# Patient Record
Sex: MALE | Race: WHITE
[De-identification: names, ages, dates, MRNs, and addresses within clinical notes are randomized per-mention and may not be internally consistent; named-entity substitution may affect disease eponyms.]

---

## 2020-02-15 ENCOUNTER — HOSPITAL ENCOUNTER (EMERGENCY)
Dept: HOSPITAL 63 - ER | Age: 65
Discharge: HOME | End: 2020-02-15
Payer: OTHER GOVERNMENT

## 2020-02-15 VITALS — WEIGHT: 211.64 LBS | HEIGHT: 68 IN | BODY MASS INDEX: 32.08 KG/M2

## 2020-02-15 VITALS — SYSTOLIC BLOOD PRESSURE: 98 MMHG | DIASTOLIC BLOOD PRESSURE: 61 MMHG
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
## 2020-02-15 DIAGNOSIS — G89.11: Primary | ICD-10-CM

## 2020-02-15 DIAGNOSIS — M25.551: ICD-10-CM

## 2020-02-15 DIAGNOSIS — M25.552: ICD-10-CM

## 2020-02-15 PROCEDURE — 73502 X-RAY EXAM HIP UNI 2-3 VIEWS: CPT

## 2020-02-15 PROCEDURE — 99283 EMERGENCY DEPT VISIT LOW MDM: CPT

## 2020-02-15 NOTE — PHYS DOC
Adult General


Chief Complaint


Chief Complaint:  HIP PAIN





HPI


HPI


64-year-old male presents with left hip pain. The patient was stepping up into 

his truck is ago and the step at ice on it. The patient slipped and fell 

straight down onto his buttocks. He is continuing to have left-sided hip pain in

the posterior area. He is able to walk, but it is more painful. The patient is 

concerned because he's had bilateral hip replacements with revision in 2018. He 

has tried ibuprofen and Tylenol at home without much relief. He wants to make 

sure there is not a problem with the hardware or another fracture.





Review of Systems


Review of Systems





Constitutional: Denies fever or chills []


Eyes: Denies change in visual acuity, redness, or eye pain []


HENT: Denies nasal congestion or sore throat []


Respiratory: Denies cough or shortness of breath []


Cardiovascular: No additional information not addressed in HPI []


GI: Denies abdominal pain, nausea, vomiting, bloody stools or diarrhea []


: Denies dysuria or hematuria []


Musculoskeletal: Left hip and buttocks pain []


Integument: Denies rash or skin lesions []


Neurologic: Denies headache, focal weakness or sensory changes []


Endocrine: Denies polyuria or polydipsia []





All other systems were reviewed and found to be within normal limits, except as 

documented in this note.





Allergies


Allergies





Allergies








Coded Allergies Type Severity Reaction Last Updated Verified


 


  No Known Drug Allergies    2/15/20 No











Physical Exam


Physical Exam





Constitutional: Well developed, well nourished, no acute distress, non-toxic 

appearance. []


HENT: Normocephalic, atraumatic, bilateral external ears normal, oropharynx 

moist, no oral exudates, nose normal. []


Eyes: PERRLA, EOMI, conjunctiva normal, no discharge. [] 


Neck: Normal range of motion, no tenderness, supple, no stridor. [] 


Cardiovascular:Heart rate regular rhythm, no murmur []


Lungs & Thorax:  Bilateral breath sounds clear to auscultation []


Abdomen: Bowel sounds normal, soft, no tenderness, no masses, no pulsatile 

masses. [] 


Skin: Warm, dry, no erythema, no rash. [] 


Back: Tenderness over the sacrum on the left sacroiliac joint.[] 


Extremities: No tenderness, no cyanosis, no clubbing, ROM intact, no edema. [] 


Neurologic: Alert and oriented X 3, normal motor function, normal sensory 

function, no focal deficits noted. []


Psychologic: Affect normal, judgement normal, mood normal. []





EKG


EKG


[]





Radiology/Procedures


Radiology/Procedures


[]


Impressions:


Examination: HIP LEFT 2V WITH PELVIS


 


History: Fall, pain


 


Comparison/Correlation: None


 


Findings: Frontal view of the pelvis was obtained. Frontal view of the 


left hip and frog leg lateral view left hip were obtained.


 


Bilateral hip joint prostheses are present. No acute fracture or bone 


destruction. No loosening evident involving the left hip joint prosthesis.


Right hip joint prosthesis was not fully included for purposes of this 


exam.


 


Sacroiliac joints are unremarkable.


 


 


Impression:


No acute process. Consider further evaluation if an occult process process


is a persistent concern.


 


Electronically signed by: Renny Melara MD (2/15/2020 3:59 PM) [id number]














DICTATED AND SIGNED BY:     RENNY MELARA MD


DATE:     02/15/20 1559





CC: JAMES GARCIA DO; PCP,NO ~





Course & Med Decision Making


Course & Med Decision Making


Pertinent Labs and Imaging studies reviewed. (See chart for details)


The patient's x-rays negative for fracture. I believe he is just bruised up. I 

will discharge him on a short course of Norco 5/325. He is stable for discharge 

at this time.


[]





Dragon Disclaimer


Dragon Disclaimer


This electronic medical record was generated, in whole or in part, using a voice

 recognition dictation system.





Departure


Departure:


Impression:  


   Primary Impression:  


   Hip pain, bilateral


Disposition:  01 HOME, SELF-CARE


Condition:  STABLE


Referrals:  


PCP,NO (PCP)


Patient Instructions:  Hip Pain


Scripts


Hydrocodone Bit/Acetaminophen (NORCO 5-325 TABLET) 1 Each Tablet


1 TAB PO PRN Q6HRS PRN for PAIN, #10 TAB 0 Refills


   Prov: JAMES GARCIA DO         2/15/20











JAMES GARCIA DO                 Feb 15, 2020 15:01

## 2020-02-15 NOTE — RAD
Examination: HIP LEFT 2V WITH PELVIS

 

History: Fall, pain

 

Comparison/Correlation: None

 

Findings: Frontal view of the pelvis was obtained. Frontal view of the 

left hip and frog leg lateral view left hip were obtained.

 

Bilateral hip joint prostheses are present. No acute fracture or bone 

destruction. No loosening evident involving the left hip joint prosthesis.

Right hip joint prosthesis was not fully included for purposes of this 

exam.

 

Sacroiliac joints are unremarkable.

 

 

Impression:

No acute process. Consider further evaluation if an occult process process

is a persistent concern.

 

Electronically signed by: Denver Naik MD (2/15/2020 3:59 PM) [id number]

## 2020-02-24 ENCOUNTER — HOSPITAL ENCOUNTER (EMERGENCY)
Dept: HOSPITAL 63 - ER | Age: 65
Discharge: HOME | End: 2020-02-24
Payer: OTHER GOVERNMENT

## 2020-02-24 VITALS — SYSTOLIC BLOOD PRESSURE: 97 MMHG | DIASTOLIC BLOOD PRESSURE: 62 MMHG

## 2020-02-24 VITALS — BODY MASS INDEX: 32.08 KG/M2 | HEIGHT: 68 IN | WEIGHT: 211.64 LBS

## 2020-02-24 DIAGNOSIS — M25.552: ICD-10-CM

## 2020-02-24 DIAGNOSIS — I10: ICD-10-CM

## 2020-02-24 DIAGNOSIS — F32.9: ICD-10-CM

## 2020-02-24 DIAGNOSIS — G89.29: Primary | ICD-10-CM

## 2020-02-24 DIAGNOSIS — E11.9: ICD-10-CM

## 2020-02-24 PROCEDURE — 99283 EMERGENCY DEPT VISIT LOW MDM: CPT

## 2020-02-24 NOTE — PHYS DOC
Past History


Past Medical History:  Depression, Diabetes, Hypertension


Past Surgical History:  Hip Replacement


Additional Past Surgical Histo:  BILATERAL HIP REPLACEMENTS, RIGHT ROTATOR CUFF


Alcohol Use:  None





Adult General


Chief Complaint


Chief Complaint:  HIP PAIN





[facility]


HPI





Patient is a 64-year-old male who presents with complaint of left hip pain and 

stating that it feels like his hip is been popping out at night. Patient was 

seen here a few weeks ago after injuring his hip and had x-rays done. Patient 

states that he does not have a primary care doctor and is hoping to get a 

referral to an orthopedist. Patient states that he has difficulty with sleeping 

at night due to level of pain. He denies any further injuries since the fall.[]





Review of Systems


Review of Systems





Constitutional: Denies fever or chills []


Respiratory: Denies cough or shortness of breath []


Cardiovascular: No additional information not addressed in HPI []


GI: Denies abdominal pain, nausea, vomiting or diarrhea []


Musculoskeletal: Positive left hip pain []


Integument: Denies rash or skin lesions []





Allergies


Allergies





Allergies








Coded Allergies Type Severity Reaction Last Updated Verified


 


  No Known Drug Allergies    2/15/20 No











Physical Exam


Physical Exam





Constitutional: Well developed, well nourished, no acute distress, non-toxic 

appearance. []


Cardiovascular:Heart rate regular rhythm, no murmur []


Lungs & Thorax:  Bilateral breath sounds clear to auscultation []


Extremities: Examination of left hip demonstrates no shortening or rotation. 

Patient does report to some pain with internal and external rotation of the hip.

 [] 


Neurologic: Alert and oriented X 3, no focal deficits noted. []





Current Patient Data


Vital Signs





                                   Vital Signs








  Date Time  Temp Pulse Resp B/P (MAP) Pulse Ox O2 Delivery O2 Flow Rate FiO2


 


2/24/20 16:41  57 18 97/62 (74) 100   











EKG


EKG


[]





Radiology/Procedures


Radiology/Procedures


[]





Course & Med Decision Making


Course & Med Decision Making


Pertinent Labs and Imaging studies reviewed. (See chart for details)





[]





Dragon Disclaimer


Dragon Disclaimer


This electronic medical record was generated, in whole or in part, using a voice

 recognition dictation system.





Departure


Departure:


Impression:  


   Primary Impression:  


   Chronic hip pain


Disposition:  01 HOME, SELF-CARE


Condition:  STABLE


Referrals:  


PCP,REBEKAH (PCP)








JED ZARATE MD


Patient Instructions:  Hip Pain





Additional Instructions:  


Call to schedule follow-up appointment with orthopedist for further evaluation.


Scripts


Hydrocodone Bit/Acetaminophen (NORCO 5-325 TABLET) 1 Each Tablet


1 TAB PO PRN Q6HRS PRN for PAIN, #12 TAB 0 Refills


   Prov: RENALDO TORRES Jr. DO         2/24/20





Problem Qualifiers








   Primary Impression:  


   Chronic hip pain


   Laterality:  left  Qualified Codes:  M25.552 - Pain in left hip; G89.29 - 

   Other chronic pain








RENALDO TORRES Jr. DO          Feb 24, 2020 17:06

## 2021-08-26 ENCOUNTER — HOSPITAL ENCOUNTER (EMERGENCY)
Dept: HOSPITAL 63 - ER | Age: 66
Discharge: HOME | End: 2021-08-26
Payer: SELF-PAY

## 2021-08-26 VITALS — WEIGHT: 211.64 LBS | HEIGHT: 68 IN | BODY MASS INDEX: 32.08 KG/M2

## 2021-08-26 VITALS — DIASTOLIC BLOOD PRESSURE: 70 MMHG | SYSTOLIC BLOOD PRESSURE: 94 MMHG

## 2021-08-26 DIAGNOSIS — M25.552: Primary | ICD-10-CM

## 2021-08-26 DIAGNOSIS — I10: ICD-10-CM
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
## 2021-08-26 DIAGNOSIS — E11.9: ICD-10-CM
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
## 2021-08-26 PROCEDURE — 73521 X-RAY EXAM HIPS BI 2 VIEWS: CPT

## 2021-08-26 PROCEDURE — 99283 EMERGENCY DEPT VISIT LOW MDM: CPT

## 2021-08-26 NOTE — RAD
EXAM: Pelvis and bilateral hips, 5 views.



HISTORY: Pain.



COMPARISON: None.



FINDINGS: A frontal view the pelvis and frontal and frog-leg views of both hips are obtained. There a
re bilateral hip arthroplasties in expected position. 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
trusio. There is no evidence of instrumentation loosening. There is mild lumbar scoliosis. There is d
egenerative endplate remodeling and facet arthropathy predominantly at the lumbosacral junction.



IMPRESSION: 

1. Bilateral hip arthroplasties unchanged in appearance.

2. Degenerative change at the lumbosacral junction.



Electronically signed by: Lacey Tobin MD (8/26/2021 10:24 AM) [id number]

## 2021-08-26 NOTE — PHYS DOC
Past History


Past Medical History:  Depression, Diabetes, Hypertension


 (DYLAN SYKSE)


Past Surgical History:  Hip Replacement


Additional Past Surgical Histo:  BILATERAL HIP REPLACEMENTS, RIGHT ROTATOR CUFF


 (DYLAN SYKES)


Alcohol Use:  None


 (DYLAN SYKES)





Adult General


Chief Complaint


Chief Complaint:  MECHANICAL FALL





HPI


HPI





Patient is a 66 year old male who reports he had fallen today, landing on his 

left hip. States he has a history of bilateral hip replacement, also within the 

lateral revision.  States he continues to have some discomfort in his left hip, 

states he just wants to make sure that his hip is in place.  Patient reports he 

has not take any medication for discomfort.  He has not not been to stand up at 

home after his fall, but was able to stand after EMS helped him up.  States he 

has been able to walk however had some discomfort in his hip still.  Denies any 

paresthesia.  Denies any loss conscious.  Denies any additional complaints.  

States he tripped which caused him to fall.


 (DYLAN SYKES)





Review of Systems


Review of Systems





Constitutional: Denies fever or chills []


Eyes: Denies change in visual acuity, redness, or eye pain []


HENT: Denies nasal congestion or sore throat []


Respiratory: Denies cough or shortness of breath []


Cardiovascular: No additional information not addressed in HPI []


GI: Denies abdominal pain, nausea, vomiting, bloody stools or diarrhea []


: Denies dysuria or hematuria []


Musculoskeletal: Denies back pain.  Reports pain to his left hip with decreased 

range of motion


Integument: Denies rash or skin lesions []


Neurologic: Denies headache, focal weakness or sensory changes []


Endocrine: Denies polyuria or polydipsia []





All other systems were reviewed and found to be within normal limits, except as 

documented in this note.


 (DYLAN SYKES)





Allergies


Allergies





Allergies








Coded Allergies Type Severity Reaction Last Updated Verified


 


  No Known Drug Allergies    2/15/20 No








 (DYLAN SYKES)





Physical Exam


Physical Exam





Constitutional: Well developed, well nourished, no acute distress, non-toxic 

appearance. []


HENT: Normocephalic, atraumatic, bilateral external ears normal, oropharynx 

moist, no oral exudates, nose normal. []


Eyes: PERRLA, EOMI, conjunctiva normal, no discharge. [] 


Neck: Normal range of motion, no tenderness, supple, no stridor. [] 


Cardiovascular:Heart rate regular rhythm, no murmur []


Lungs & Thorax:  Bilateral breath sounds clear to auscultation []


Abdomen: Bowel sounds normal, soft, no tenderness, no masses, no pulsatile 

masses. [] 


Skin: Warm, dry, no erythema, no rash. [] 


Back: No tenderness, no CVA tenderness. [] 


Extremities: No tenderness, no cyanosis, no clubbing, ROM intact, no edema. [] 

Full range of motion to ankle, knee.  Able to raise bilateral legs from 

independently, with decreased range of motion, which he reports is similar to 

what his normal range of motion is.  Circulation intact.


Neurologic: Alert and oriented X 3, normal motor function, normal sensory 

function, no focal deficits noted. []


Psychologic: Affect normal, judgement normal, mood normal. []


 (DYLAN SYKES)





Current Patient Data


Vital Signs





                                   Vital Signs








  Date Time  Temp Pulse Resp B/P (MAP) Pulse Ox O2 Delivery O2 Flow Rate FiO2


 


8/26/21 10:01 98.8 57 16 94/70 (78) 96   








 (DYLAN SYKES)





EKG


EKG


[]


 (DYLAN SKYES)





Radiology/Procedures


Radiology/Procedures


[]EXAM: Pelvis and bilateral hips, 5 views.





HISTORY: Pain.





COMPARISON: None.





FINDINGS: A frontal view the pelvis and frontal and frog-leg views of both hips 

are obtained. There are bilateral hip arthroplasties in expected position. There

 is stable evidence of left acetabulum protrusio. There is no evidence of 

instrumentation loosening. There is mild lumbar scoliosis. There is degenerative

 endplate remodeling and facet arthropathy predominantly at the lumbosacral 

junction.





IMPRESSION: 


1. Bilateral hip arthroplasties unchanged in appearance.


2. Degenerative change at the lumbosacral junction.





Electronically signed by: Lacey Henley MD (8/26/2021 10:24 AM) [id number]














DICTATED AND SIGNED BY:     LACEY HENLEY MD


DATE:     08/26/21 1022


 (DYLAN SYKES)





Heart Score


C/O Chest Pain:  N/A


Risk Factors:


Risk Factors:  DM, Current or recent (<one month) smoker, HTN, HLP, family 

history of CAD, obesity.


Risk Scores:


Risk Factors:  DM, Current or recent (<one month) smoker, HTN, HLP, family 

history of CAD, obesity.


 (DYLAN SYKES)





Course & Med Decision Making


Course & Med Decision Making


Pertinent Labs and Imaging studies reviewed. (See chart for details)





[] Given history of fall, with discomfort when trying to move, will do imaging.





Limited results, without acute fracture, dislocation, or abnormality, believe 

patient safe for discharge.  Patient reports he does feel better, he does walk 

with a cane at times at home.  He feels good about going home at this time.  

Will provide short course of pain medications and recommend patient follow-up 

with his primary care for additional.  Patient agreed with this plan





Reviewed [facility], most recent narcotic prescription February of this year.  Did

 [facility].  Regular

 clonazepam per [facility] in primary care.


 (DYLAN SYKES)


Course & Med Decision Making


I was the Attending physician on the above date of service of this patient. This

 patient was evaluated, examined, treated, and dispositioned from the emergency 

department by the mid-level practitioner.  Although I was working at the time , 

no assistance was requested. 





Electronically signed, Quin Craft DO





 (QUIN CRAFT DO)


Tomer Disclaimer


Dragon Disclaimer


This electronic medical record was generated, in whole or in part, using a voice

 recognition dictation system.


 (DYLAN SYKES)





Departure


Departure:


Impression:  


   Primary Impression:  


   Left hip pain


   Additional Impression:  


   Fall from standing


Disposition:  01 HOME / SELF CARE / HOMELESS


Condition:  GOOD


Referrals:  


PCP,NO (PCP)


Patient Instructions:  Fall Prevention and Home Safety, Hip Pain





Additional Instructions:  


As discussed, follow-up with your primary care provider as needed for additional

 pain medications.  Take Tylenol or ibuprofen as needed for discomfort.  You may

 take the pain medications as prescribed.





As we discussed also, consider using a cane to help improve your stability and 

prevent further falls.


Scripts


Hydrocodone/Acetaminophen (Hydrocodone-Acetamin 5-325 mg) 1 Each Tablet


1 EACH PO Q6HRS PRN for PAIN for 3 Days, #12 TAB


   Prov: DYLAN SYKES         8/26/21





Problem Qualifiers








   Additional Impression:  


   Fall from standing


   Encounter type:  initial encounter  Qualified Codes:  W19.XXXA - Unspecified 

   fall, initial encounter








DYLAN SYKES              Aug 26, 2021 10:12


QUIN CRAFT DO                 Aug 27, 2021 07:11

## 2021-09-07 ENCOUNTER — HOSPITAL ENCOUNTER (EMERGENCY)
Dept: HOSPITAL 63 - ER | Age: 66
Discharge: HOME | End: 2021-09-07
Payer: OTHER GOVERNMENT

## 2021-09-07 VITALS — HEIGHT: 68 IN | BODY MASS INDEX: 32.08 KG/M2 | WEIGHT: 211.64 LBS

## 2021-09-07 VITALS — DIASTOLIC BLOOD PRESSURE: 66 MMHG | SYSTOLIC BLOOD PRESSURE: 156 MMHG

## 2021-09-07 DIAGNOSIS — F32.9: ICD-10-CM

## 2021-09-07 DIAGNOSIS — E11.9: ICD-10-CM

## 2021-09-07 DIAGNOSIS — R51.9: ICD-10-CM
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
## 2021-09-07 DIAGNOSIS — M25.512: Primary | ICD-10-CM
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
## 2021-09-07 DIAGNOSIS — M25.552: ICD-10-CM

## 2021-09-07 DIAGNOSIS — I10: ICD-10-CM
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
## 2021-09-07 PROCEDURE — 99285 EMERGENCY DEPT VISIT HI MDM: CPT

## 2021-09-07 PROCEDURE — 72125 CT NECK SPINE W/O DYE: CPT

## 2021-09-07 PROCEDURE — 70450 CT HEAD/BRAIN W/O DYE: CPT

## 2021-09-07 PROCEDURE — 73521 X-RAY EXAM HIPS BI 2 VIEWS: CPT

## 2021-09-07 PROCEDURE — 73030 X-RAY EXAM OF SHOULDER: CPT

## 2021-09-07 NOTE — PHYS DOC
Past History


Past Medical History:  Depression, Diabetes, Hypertension


 (TAWANNA MUNGUIA)


Past Surgical History:  Hip Replacement


Additional Past Surgical Histo:  BILATERAL HIP REPLACEMENTS, RIGHT ROTATOR CUFF


 (TAWANNA MUNGUIA)


Alcohol Use:  None


 (TAWANNA MUNGUIA)





General Adult


EDM:


Chief Complaint:  MECHANICAL FALL





HPI:


HPI:





Patient is a 66-year-old male who presents with left shoulder, left hip, 

posterior head pain after a fall.  Patient states he was getting out of his 

truck when he stepped wrong and fell and hit the back of his head.  Patient de

nies loss of consciousness.  Denies neck or back pain.  Denies blood thinners.  

Patient states "I thought I should probably be seen since I was having a lot of 

pain in my shoulder and hip".  Denies taking anything for pain prior to arrival.

 History of hypertension, diabetes, depression.


 (TAWANNA MUNGUIA)





Review of Systems:


Review of Systems:


Constitutional:  Denies fever or chills 


Eyes:  Denies change in visual acuity 


HENT:  Denies nasal congestion or sore throat 


Respiratory:  Denies cough or shortness of breath 


Cardiovascular:  Denies chest pain or edema 


GI:  Denies abdominal pain, nausea, vomiting, bloody stools or diarrhea 


: Denies dysuria 


Musculoskeletal: Reports left shoulder and left hip pain


Integument:  Denies rash 


Neurologic:  Denies headache, focal weakness or sensory changes 


Endocrine:  Denies polyuria or polydipsia 


Lymphatic:  Denies swollen glands 


Psychiatric: Reports history of depression and anxiety


 (TAWANNA MUNGUIA)





Allergies:


Allergies:





Allergies








Coded Allergies Type Severity Reaction Last Updated Verified


 


  No Known Drug Allergies    2/15/20 No








 (TAWANNA MUNGUIA)





Physical Exam:


PE:





Constitutional: Well developed, well nourished, no acute distress, non-toxic 

appearance. []


HENT: Normocephalic, atraumatic, bilateral external ears normal, oropharynx 

moist, no oral exudates, nose normal. []


Eyes: PERRLA, EOMI, conjunctiva normal, no discharge. [] 


Neck: Normal range of motion, no tenderness, supple, no stridor. [] 


Cardiovascular:Heart rate regular rhythm, no murmur []


Lungs & Thorax:  Bilateral breath sounds clear to auscultation []


Abdomen: Bowel sounds normal, soft, no tenderness, no masses, no pulsatile 

masses. [] 


Skin: Warm, dry, no erythema, no rash. [] 


Back: No tenderness, no CVA tenderness. [] 


Extremities: Left shoulder tenderness, no cyanosis, no clubbing, ROM intact, no 

edema. [] 


Neurologic: Alert and oriented X 3, normal motor function, normal sensory 

function, no focal deficits noted. []


Psychologic: Affect normal, judgement normal, mood normal. []


 (TAWANNA MUNGUIA)





Current Patient Data:


Vital Signs:





                                   Vital Signs








  Date Time  Temp Pulse Resp B/P (MAP) Pulse Ox O2 Delivery O2 Flow Rate FiO2


 


9/7/21 17:58  67 18 156/66 99   








 (TAWANNA MUNGUIA)





EKG:


EKG:


[]


 (TAWANNA MUNGUIA)





Radiology/Procedures:


Radiology/Procedures:


[]


 (TAWANNA MUNGUIA)


Radiology/Procedures:


PROCEDURE: SHOULDER 2+V LEFT





Study: XR SHOULDER_LEFT 2+ VIEWS





Indication: Fall.





Comparison: None.





Findings:





No traumatic malalignment or displaced fracture. Mild acromioclavicular more so 

than glenohumeral joint arthrosis. The partially assessed left-sided ribs are 

grossly intact.





Carotid calcific atherosclerosis on the left.





Impression:





1. No acute fracture or traumatic malalignment. Mild arthrosis.


2. Incidental carotid calcific atherosclerosis on the left.





Electronically signed by: GUIDO SALINAS MD (9/7/2021 7:06 PM) [facility]





PROCEDURE: HIP BILATERAL WITH PELVIS





Study: XR HIP (WITH OR WITHOUT PELVIS) 1 VIEW





Indication: Fall. Bilateral hip pain.





Comparison: 8/26/2021





Findings:





Bilateral total hip arthroplasty constructs. No hardware or periprosthetic 

fracture. No evidence for loosening. Unchanged chronic ossific fragments/osseous

 remodeling at the left more so than right greater trochanters. No evidence for 

an obturator ring fracture. The adequately assessed sacral arcuate lines are 

continuous. Incompletely evaluated degenerative changes at the lower lumbar 

spine.





Impression:





No acute fracture identified at either hip or elsewhere through the partially 

assessed pelvis. No change in configuration of bilateral total hip arthroplasty 

constructs from 8/26/2021.





Electronically signed by: GUIDO SALINAS MD (9/7/2021 7:04 PM) [facility]





PROCEDURE: CT HEAD AND CERVICAL SPINE WO





EXAMINATION: CT HEAD AND C-SPINE WO





CLINICAL HISTORY: Fall, headache and neck pain 





TECHNIQUE:


Serial axial images without IV contrast were obtained from the vertex to the 

foramen magnum.





CT of the cervical spine without IV contrast. Spiral, high resolution axial 

images were obtained from the skull base to the cervicothoracic junction with 

sagittal and coronal planar reconstructions.





CT Dose Reduction Employed: One or more of the following individualized dose 

reduction techniques were utilized for this examination:  1. Automated exposure 

control  2. Adjustment of the mA and/or kV according to patient size  3. Use of 

iterative reconstruction technique.





COMPARISON: None








FINDINGS:  





BRAIN:


Acute Change: No evidence of an acute contusion or other acute parenchymal 

process.


  


Hemorrhage: No evidence of acute intracranial hemorrhage.





Mass Lesion/Mass Effect: No evidence of intracranial mass or extraaxial fluid 

collection. No significant mass effect.


 


Chronic Change: Scattered patchy foci of hypoattenuation in the supratentorial 

white matter, nonspecific but likely represents mild microvascular ischemia. 

Atherosclerotic calcification of the anterior and posterior circulation.





Parenchyma: Moderate generalized volume loss, asymmetrically prominent in the 

frontal lobes.





Ventricles: Ventricular enlargement concordant with degree of parenchymal volume

 loss.





Paranasal Sinuses and Skull Base: Visualized paranasal sinuses clear. No 

evidence of acute calvarial fracture. Probable nasal bone fracture, likely 

remote given lack of associated soft tissue swelling. Mild anterior nasal septal

 deviation to the left.








C-SPINE: 


Alignment: Straightening of the normal cervical lordosis, likely positional.





Osseous Structures: No evidence of acute fracture or spondylolisthesis. Remote 

ununited fracture through the T1 spinous process. Heterogeneous mineralization 

in the lower cervical and partially visualized thoracic vertebral bodies with 6 

mm focal lucency in the anterior inferior C5 vertebral body, cannot exclude a 

marrow infiltrative process.





Degenerative Changes: Multilevel degenerative disc disease, moderate to severe 

in the lower cervical spine. Moderate to severe multilevel neural foraminal 

narrowing. No definite high-grade osseous spinal stenosis. Multilevel facet 

arthropathy. 





Cervical Soft Tissues: No prevertebral soft tissue swelling. Vascular 

calcifications.








IMPRESSION:  





BRAIN:


No evidence of acute intracranial abnormality.





C-SPINE:


No evidence of acute osseous abnormality involving the cervical spine.





Findings suspicious for a marrow infiltrative process in the cervicothoracic 

spine as described with possible lytic lesion in the C5 vertebral body. 

Recommend clinical correlation and consider nonemergent outpatient MRI for 

further evaluation as indicated.





Multilevel degenerative findings as described.





Electronically signed by: Gelacio Cronin DO (9/7/2021 7:06 PM) [facility]


 (SIMONE PETERSON)


Heart Score:


C/O Chest Pain:  No


Risk Factors:


Risk Factors:  DM, Current or recent (<one month) smoker, HTN, HLP, family 

history of CAD, obesity.


Risk Scores:


Score 0 - 3:  2.5% MACE over next 6 weeks - Discharge Home


Score 4 - 6:  20.3% MACE over next 6 weeks - Admit for Clinical Observation


Score 7 - 10:  72.7% MACE over next 6 weeks - Early Invasive Strategies


 (TAWANNA MUNGUIA)





Course & Med Decision Making:


Course & Med Decision Making


Pertinent Labs and Imaging studies reviewed. (See chart for details)





[] 66-year-old male presents with left shoulder, hip, posterior hip pain after a

 recent fall.  Patient was getting out of his truck when he stepped incorrectly 

and fell backwards.  Patient reports hitting his head.  Denies loss of 

consciousness.  Denies blood thinners.  Denies visual changes.  Patient is alert

 and oriented.  Left hip, left shoulder, CT head and spine ordered to rule out 

acute abnormalities or fractures.





 (TAWANNA MUNGUIA)





Dragon Disclaimer:


Dragon Disclaimer:


This electronic medical record was generated, in whole or in part, using a voice

 recognition dictation system.


 (TAWANNA MUNGUIA)





Departure


Departure:


Impression:  


   Primary Impression:  


   Left shoulder pain


   Qualified Codes:  M25.512 - Pain in left shoulder


   Additional Impression:  


   Left hip pain


Disposition:  01 HOME / SELF CARE / HOMELESS


Condition:  STABLE


Referrals:  


PCP,NO (PCP)


Patient Instructions:  RICE - Routine Care for Injuries, Easy-to-Read





Additional Instructions:  


No fractures seen on imaging.  Use over-the-counter acetaminophen and ibuprofen 

alternating as needed for pain and muscle soreness.  Return to emergency 

department if pain is unmanageable.





Attending Co-Sign


Attending Co-Sign


The patient was seen and interviewed as well as examined at the bedside. The 

chart was reviewed. The case was discussed. Agree with the plan of care.


 (TRAM RANGEL MD)





Attending Signature


Attending Signature


I have participated in the care of this patient and I have reviewed and agree 

with all pertinent clinical information above including history, exam, and 

recommendations.





 (CLAIRETRAM SOTELO ASHLEE APRN                Sep 7, 2021 18:50


TRAM RANGEL MD            Sep 7, 2021 19:04


SIMONE PETERSON               Sep 7, 2021 19:54

## 2021-09-07 NOTE — RAD
EXAMINATION: CT HEAD AND C-SPINE WO



CLINICAL HISTORY: Fall, headache and neck pain 



TECHNIQUE:

Serial axial images without IV contrast were obtained from the vertex to the foramen magnum.



CT of the cervical spine without IV contrast. Spiral, high resolution axial images were obtained from
 the skull base to the cervicothoracic junction with sagittal and coronal planar reconstructions.



CT Dose Reduction Employed: One or more of the following individualized dose reduction techniques wer
e utilized for this examination:  1. Automated exposure control  2. Adjustment of the mA and/or kV ac
cording to patient size  3. Use of iterative reconstruction technique.



COMPARISON: None





FINDINGS:  



BRAIN:

Acute Change: No evidence of an acute contusion or other acute parenchymal process.

  

Hemorrhage: No evidence of acute intracranial hemorrhage.



Mass Lesion/Mass Effect: No evidence of intracranial mass or extraaxial fluid collection. No signific
ant mass effect.

 

Chronic Change: Scattered patchy foci of hypoattenuation in the supratentorial white matter, nonspeci
fic but likely represents mild microvascular ischemia. Atherosclerotic calcification of the anterior 
and posterior circulation.



Parenchyma: Moderate generalized volume loss, asymmetrically prominent in the frontal lobes.



Ventricles: Ventricular enlargement concordant with degree of parenchymal volume loss.



Paranasal Sinuses and Skull Base: Visualized paranasal sinuses clear. No evidence of acute calvarial 
fracture. Probable nasal bone fracture, likely remote given lack of associated soft tissue swelling. 
Mild anterior nasal septal deviation to the left.





C-SPINE: 

Alignment: Straightening of the normal cervical lordosis, likely positional.



Osseous Structures: No evidence of acute fracture or spondylolisthesis. Remote ununited fracture thro
ugh the T1 spinous process. Heterogeneous mineralization in the lower cervical and partially visualiz
ed thoracic vertebral bodies with 6 mm focal lucency in the anterior inferior C5 vertebral body, navya
ot exclude a marrow infiltrative process.



Degenerative Changes: Multilevel degenerative disc disease, moderate to severe in the lower cervical 
spine. Moderate to severe multilevel neural foraminal narrowing. No definite high-grade osseous spina
l stenosis. Multilevel facet arthropathy. 



Cervical Soft Tissues: No prevertebral soft tissue swelling. Vascular calcifications.





IMPRESSION:  



BRAIN:

No evidence of acute intracranial abnormality.



C-SPINE:

No evidence of acute osseous abnormality involving the cervical spine.



Findings suspicious for a marrow infiltrative process in the cervicothoracic spine as described with 
possible lytic lesion in the C5 vertebral body. Recommend clinical correlation and consider nonemerge
nt outpatient MRI for further evaluation as indicated.



Multilevel degenerative findings as described.



Electronically signed by: Gelacio Cronin DO (9/7/2021 7:06 PM) [facility]

## 2021-09-07 NOTE — RAD
Study: XR HIP (WITH OR WITHOUT PELVIS) 1 VIEW



Indication: Fall. Bilateral hip pain.



Comparison: 8/26/2021



Findings:



Bilateral total hip arthroplasty constructs. No hardware or periprosthetic fracture. No evidence for 
loosening. Unchanged chronic ossific fragments/osseous remodeling at the left more so than right grea
ter trochanters. No evidence for an obturator ring fracture. The adequately assessed sacral arcuate l
janny are continuous. Incompletely evaluated degenerative changes at the lower lumbar spine.



Impression:



No acute fracture identified at either hip or elsewhere through the partially assessed pelvis. No jered
nge in configuration of bilateral total hip arthroplasty constructs from 8/26/2021.



Electronically signed by: GUIDO SALINAS MD (9/7/2021 7:04 PM) [facility]

## 2021-09-07 NOTE — RAD
Study: XR SHOULDER_LEFT 2+ VIEWS



Indication: Fall.



Comparison: None.



Findings:



No traumatic malalignment or displaced fracture. Mild acromioclavicular more so than glenohumeral jose de jesus
nt arthrosis. The partially assessed left-sided ribs are grossly intact.



Carotid calcific atherosclerosis on the left.



Impression:



1. No acute fracture or traumatic malalignment. Mild arthrosis.

2. Incidental carotid calcific atherosclerosis on the left.



Electronically signed by: GUIDO SALINAS MD (9/7/2021 7:06 PM) [facility]